# Patient Record
Sex: FEMALE
[De-identification: names, ages, dates, MRNs, and addresses within clinical notes are randomized per-mention and may not be internally consistent; named-entity substitution may affect disease eponyms.]

---

## 2021-03-30 ENCOUNTER — HOSPITAL ENCOUNTER (OUTPATIENT)
Dept: HOSPITAL 90 - LAB | Age: 36
Discharge: HOME | End: 2021-03-30
Attending: NURSE PRACTITIONER
Payer: COMMERCIAL

## 2021-03-30 DIAGNOSIS — Z00.00: Primary | ICD-10-CM

## 2021-03-30 LAB
BASOPHILS NFR BLD AUTO: 0.4 % (ref 0–5)
BUN SERPL-MCNC: 14 MG/DL (ref 7–18)
CHLORIDE SERPL-SCNC: 107 MMOL/L (ref 101–111)
CO2 SERPL-SCNC: 26 MMOL/L (ref 21–32)
CREAT SERPL-MCNC: 0.8 MG/DL (ref 0.5–1.5)
EOSINOPHIL NFR BLD AUTO: 2.6 % (ref 0–8)
ERYTHROCYTE [DISTWIDTH] IN BLOOD BY AUTOMATED COUNT: 14.1 % (ref 11–15.5)
GFR SERPL CREATININE-BSD FRML MDRD: 87 ML/MIN (ref 60–?)
GLUCOSE SERPL-MCNC: 128 MG/DL (ref 70–105)
HBA1C MFR BLD: 5.9 % (ref 4–6)
HCT VFR BLD AUTO: 46.2 % (ref 36–48)
LYMPHOCYTES NFR SPEC AUTO: 26.5 % (ref 21–51)
MCH RBC QN AUTO: 28.7 PG (ref 27–33)
MCHC RBC AUTO-ENTMCNC: 30.7 G/DL (ref 32–36)
MCV RBC AUTO: 93.5 FL (ref 79–99)
MONOCYTES NFR BLD AUTO: 7.9 % (ref 3–13)
NEUTROPHILS NFR BLD AUTO: 61.9 % (ref 40–77)
NRBC BLD MANUAL-RTO: 0 % (ref 0–0.19)
PLATELET # BLD AUTO: 401 K/UL (ref 130–400)
POTASSIUM SERPL-SCNC: 4.8 MMOL/L (ref 3.5–5.1)
RBC # BLD AUTO: 4.94 MIL/UL (ref 4–5.5)
RBC MORPH BLD: (no result)
SODIUM SERPL-SCNC: 142 MMOL/L (ref 136–145)
WBC # BLD AUTO: 9.7 K/UL (ref 4.8–10.8)

## 2021-03-30 PROCEDURE — 36415 COLL VENOUS BLD VENIPUNCTURE: CPT

## 2021-03-30 PROCEDURE — 83036 HEMOGLOBIN GLYCOSYLATED A1C: CPT

## 2021-03-30 PROCEDURE — 80048 BASIC METABOLIC PNL TOTAL CA: CPT

## 2021-03-30 PROCEDURE — 85025 COMPLETE CBC W/AUTO DIFF WBC: CPT

## 2021-11-29 ENCOUNTER — HOSPITAL ENCOUNTER (OUTPATIENT)
Dept: HOSPITAL 90 - RAH | Age: 36
Discharge: HOME | End: 2021-11-29
Attending: NURSE PRACTITIONER
Payer: COMMERCIAL

## 2021-11-29 DIAGNOSIS — R06.02: ICD-10-CM

## 2021-11-29 DIAGNOSIS — Z00.00: Primary | ICD-10-CM

## 2021-11-29 LAB
ALBUMIN SERPL-MCNC: 4.1 G/DL (ref 3.5–5)
ALT SERPL-CCNC: 37 U/L (ref 12–78)
AST SERPL-CCNC: 16 U/L (ref 10–37)
BASOPHILS NFR BLD AUTO: 0.4 % (ref 0–5)
BILIRUB SERPL-MCNC: 0.3 MG/DL (ref 0.2–1)
BUN SERPL-MCNC: 17 MG/DL (ref 7–18)
CHLORIDE SERPL-SCNC: 103 MMOL/L (ref 101–111)
CHOLEST SERPL-MCNC: 189 MG/DL (ref ?–200)
CO2 SERPL-SCNC: 25 MMOL/L (ref 21–32)
CREAT SERPL-MCNC: 0.7 MG/DL (ref 0.5–1.5)
EOSINOPHIL NFR BLD AUTO: 1.7 % (ref 0–8)
ERYTHROCYTE [DISTWIDTH] IN BLOOD BY AUTOMATED COUNT: 13.5 % (ref 11–15.5)
GFR SERPL CREATININE-BSD FRML MDRD: 102 ML/MIN (ref 60–?)
GLUCOSE SERPL-MCNC: 107 MG/DL (ref 70–105)
HBA1C MFR BLD: 5.9 % (ref 4–6)
HCT VFR BLD AUTO: 45.9 % (ref 36–48)
HDLC SERPL-MCNC: 58 MG/DL (ref 35–85)
LDLC SERPL CALC-MCNC: 117 MG/DL (ref 0–99)
LYMPHOCYTES NFR SPEC AUTO: 27.8 % (ref 21–51)
MCH RBC QN AUTO: 29.3 PG (ref 27–33)
MCHC RBC AUTO-ENTMCNC: 31.4 G/DL (ref 32–36)
MCV RBC AUTO: 93.5 FL (ref 79–99)
MONOCYTES NFR BLD AUTO: 6.6 % (ref 3–13)
NEUTROPHILS NFR BLD AUTO: 63.1 % (ref 40–77)
NRBC BLD MANUAL-RTO: 0 % (ref 0–0.19)
PLATELET # BLD AUTO: 181 K/UL (ref 130–400)
POTASSIUM SERPL-SCNC: 4.2 MMOL/L (ref 3.5–5.1)
PROT SERPL-MCNC: 7.8 G/DL (ref 6–8.3)
RBC # BLD AUTO: 4.91 MIL/UL (ref 4–5.5)
SODIUM SERPL-SCNC: 137 MMOL/L (ref 136–145)
TRIGL SERPL-MCNC: 105 MG/DL (ref 30–200)
TSH SERPL DL<=0.05 MIU/L-ACNC: 1.37 UIU/ML (ref 0.36–3.74)
WBC # BLD AUTO: 11.1 K/UL (ref 4.8–10.8)

## 2021-11-29 PROCEDURE — 84443 ASSAY THYROID STIM HORMONE: CPT

## 2021-11-29 PROCEDURE — 80061 LIPID PANEL: CPT

## 2021-11-29 PROCEDURE — 82306 VITAMIN D 25 HYDROXY: CPT

## 2021-11-29 PROCEDURE — 80053 COMPREHEN METABOLIC PANEL: CPT

## 2021-11-29 PROCEDURE — 85025 COMPLETE CBC W/AUTO DIFF WBC: CPT

## 2021-11-29 PROCEDURE — 71046 X-RAY EXAM CHEST 2 VIEWS: CPT

## 2021-11-29 PROCEDURE — 83036 HEMOGLOBIN GLYCOSYLATED A1C: CPT

## 2021-11-29 PROCEDURE — 36415 COLL VENOUS BLD VENIPUNCTURE: CPT

## 2024-08-14 ENCOUNTER — HOSPITAL ENCOUNTER (EMERGENCY)
Dept: HOSPITAL 90 - EDH | Age: 39
LOS: 1 days | Discharge: HOME | End: 2024-08-15
Payer: COMMERCIAL

## 2024-08-14 VITALS — BODY MASS INDEX: 28.68 KG/M2 | WEIGHT: 151.9 LBS | HEIGHT: 61 IN

## 2024-08-14 DIAGNOSIS — Z88.8: ICD-10-CM

## 2024-08-14 DIAGNOSIS — Z98.890: ICD-10-CM

## 2024-08-14 DIAGNOSIS — Z88.2: ICD-10-CM

## 2024-08-14 DIAGNOSIS — Z88.5: ICD-10-CM

## 2024-08-14 DIAGNOSIS — G89.29: Primary | ICD-10-CM

## 2024-08-14 DIAGNOSIS — M54.89: ICD-10-CM

## 2024-08-14 PROCEDURE — 96375 TX/PRO/DX INJ NEW DRUG ADDON: CPT

## 2024-08-14 PROCEDURE — 96374 THER/PROPH/DIAG INJ IV PUSH: CPT

## 2024-08-14 PROCEDURE — 81001 URINALYSIS AUTO W/SCOPE: CPT

## 2024-08-14 PROCEDURE — 96372 THER/PROPH/DIAG INJ SC/IM: CPT

## 2024-08-14 PROCEDURE — 81025 URINE PREGNANCY TEST: CPT

## 2024-08-14 PROCEDURE — 99284 EMERGENCY DEPT VISIT MOD MDM: CPT

## 2024-08-15 VITALS
RESPIRATION RATE: 20 BRPM | SYSTOLIC BLOOD PRESSURE: 130 MMHG | DIASTOLIC BLOOD PRESSURE: 86 MMHG | OXYGEN SATURATION: 100 % | HEART RATE: 77 BPM

## 2024-08-15 LAB
APPEARANCE UR: CLEAR
BACTERIA URNS QL MICRO: (no result) /HPF
BILIRUB UR QL STRIP: NEGATIVE MG/DL
CAOX CRY URNS QL MICRO: (no result) /LPF
COLOR UR: YELLOW
DEPRECATED SQUAMOUS URNS QL MICRO: (no result) /HPF (ref 0–2)
GLUCOSE UR STRIP-MCNC: NEGATIVE MG/DL
HCG UR QL: NEGATIVE
HGB UR QL STRIP: NEGATIVE
KETONES UR STRIP-MCNC: NEGATIVE MG/DL
LEUKOCYTE ESTERASE UR QL STRIP: NEGATIVE LEU/UL
MICRO URNS: YES
NITRITE UR QL STRIP: NEGATIVE
PH UR STRIP: 5.5 [PH] (ref 5–8)
PROT UR QL STRIP: 10 MG/DL
RBC #/AREA URNS HPF: (no result) /HPF (ref 0–1)
SP GR UR STRIP: 1.03 (ref 1–1.03)
UROBILINOGEN UR STRIP-MCNC: 0.2 MG/DL (ref 0.2–1)
WBC #/AREA URNS HPF: (no result) /HPF (ref 0–1)

## 2024-08-15 RX ADMIN — TRIAMCINOLONE ACETONIDE ONE MG: 40 INJECTION, SUSPENSION INTRA-ARTICULAR; INTRAMUSCULAR at 02:32

## 2024-08-15 RX ADMIN — ORPHENADRINE CITRATE ONE MG: 30 INJECTION INTRAMUSCULAR; INTRAVENOUS at 02:31

## 2024-08-15 RX ADMIN — KETOROLAC TROMETHAMINE ONE MG: 30 INJECTION, SOLUTION INTRAMUSCULAR; INTRAVENOUS at 02:31

## 2024-08-19 ENCOUNTER — HOSPITAL ENCOUNTER (OUTPATIENT)
Dept: HOSPITAL 90 - LAB | Age: 39
Discharge: HOME | End: 2024-08-19
Attending: INTERNAL MEDICINE
Payer: COMMERCIAL

## 2024-08-19 DIAGNOSIS — R55: Primary | ICD-10-CM

## 2024-08-19 LAB
BUN SERPL-MCNC: 9 MG/DL (ref 7–18)
CHLORIDE SERPL-SCNC: 104 MMOL/L (ref 101–111)
CO2 SERPL-SCNC: 28 MMOL/L (ref 21–32)
CREAT SERPL-MCNC: 0.5 MG/DL (ref 0.5–1)
GFR SERPL CREATININE-BSD FRML MDRD: 124 ML/MIN (ref 90–?)
GLUCOSE SERPL-MCNC: 83 MG/DL (ref 70–105)
MAGNESIUM SERPL-MCNC: 2.3 MG/DL (ref 1.8–2.4)
POTASSIUM SERPL-SCNC: 4.1 MMOL/L (ref 3.5–5.1)
SODIUM SERPL-SCNC: 139 MMOL/L (ref 136–145)

## 2024-08-19 PROCEDURE — 36415 COLL VENOUS BLD VENIPUNCTURE: CPT

## 2024-08-19 PROCEDURE — 80048 BASIC METABOLIC PNL TOTAL CA: CPT

## 2024-08-19 PROCEDURE — 83735 ASSAY OF MAGNESIUM: CPT

## 2024-11-21 ENCOUNTER — HOSPITAL ENCOUNTER (EMERGENCY)
Dept: HOSPITAL 90 - EDH | Age: 39
Discharge: HOME | End: 2024-11-21
Payer: COMMERCIAL

## 2024-11-21 VITALS
OXYGEN SATURATION: 99 % | RESPIRATION RATE: 18 BRPM | SYSTOLIC BLOOD PRESSURE: 134 MMHG | HEART RATE: 76 BPM | DIASTOLIC BLOOD PRESSURE: 75 MMHG

## 2024-11-21 VITALS — BODY MASS INDEX: 29.64 KG/M2 | WEIGHT: 156.97 LBS | HEIGHT: 61 IN

## 2024-11-21 VITALS — TEMPERATURE: 98.6 F

## 2024-11-21 DIAGNOSIS — Z98.890: ICD-10-CM

## 2024-11-21 DIAGNOSIS — G89.29: Primary | ICD-10-CM

## 2024-11-21 DIAGNOSIS — Z88.2: ICD-10-CM

## 2024-11-21 DIAGNOSIS — Z79.899: ICD-10-CM

## 2024-11-21 DIAGNOSIS — Z88.5: ICD-10-CM

## 2024-11-21 DIAGNOSIS — M54.50: ICD-10-CM

## 2024-11-21 PROCEDURE — 99284 EMERGENCY DEPT VISIT MOD MDM: CPT

## 2024-11-21 PROCEDURE — 96372 THER/PROPH/DIAG INJ SC/IM: CPT

## 2024-11-21 RX ADMIN — ORPHENADRINE CITRATE ONE MG: 30 INJECTION INTRAMUSCULAR; INTRAVENOUS at 19:17

## 2024-11-21 RX ADMIN — TRIAMCINOLONE ACETONIDE ONE MG: 40 INJECTION, SUSPENSION INTRA-ARTICULAR; INTRAMUSCULAR at 19:17

## 2024-11-27 ENCOUNTER — HOSPITAL ENCOUNTER (OUTPATIENT)
Dept: HOSPITAL 90 - LAB | Age: 39
Discharge: HOME | End: 2024-11-27
Payer: COMMERCIAL

## 2024-11-27 DIAGNOSIS — K76.0: Primary | ICD-10-CM

## 2024-11-27 DIAGNOSIS — E78.6: ICD-10-CM

## 2024-11-27 LAB
ALBUMIN SERPL-MCNC: 4 G/DL (ref 3.5–5)
ALT SERPL-CCNC: 47 U/L (ref 12–78)
AST SERPL-CCNC: 26 U/L (ref 10–37)
BASOPHILS # BLD AUTO: 0.04 K/UL (ref 0–0.2)
BASOPHILS NFR BLD AUTO: 0.6 % (ref 0–5)
BILIRUB SERPL-MCNC: 0.4 MG/DL (ref 0.2–1)
BUN SERPL-MCNC: 13 MG/DL (ref 7–18)
CHLORIDE SERPL-SCNC: 105 MMOL/L (ref 101–111)
CO2 SERPL-SCNC: 30 MMOL/L (ref 21–32)
CREAT SERPL-MCNC: 0.7 MG/DL (ref 0.5–1)
EOSINOPHIL # BLD AUTO: 0.15 K/UL (ref 0–0.7)
EOSINOPHIL NFR BLD AUTO: 2.2 % (ref 0–8)
ERYTHROCYTE [DISTWIDTH] IN BLOOD BY AUTOMATED COUNT: 13.8 % (ref 11–15.5)
FERRITIN SERPL-MCNC: 8 NG/ML (ref 15–150)
GFR SERPL CREATININE-BSD FRML MDRD: 114 ML/MIN (ref 90–?)
GLUCOSE SERPL-MCNC: 88 MG/DL (ref 70–105)
HCT VFR BLD AUTO: 36.5 % (ref 36–48)
IMM GRANULOCYTES # BLD: 0.01 K/UL (ref 0–1)
LYMPHOCYTES # SPEC AUTO: 2.2 K/UL (ref 1–4.8)
LYMPHOCYTES NFR SPEC AUTO: 32.7 % (ref 21–51)
MCH RBC QN AUTO: 28.8 PG (ref 27–33)
MCHC RBC AUTO-ENTMCNC: 31.5 G/DL (ref 32–36)
MCV RBC AUTO: 91.5 FL (ref 79–99)
MONOCYTES # BLD AUTO: 0.5 K/UL (ref 0.1–1)
MONOCYTES NFR BLD AUTO: 7.7 % (ref 3–13)
NEUTROPHILS # BLD AUTO: 3.8 K/UL (ref 1.8–7.7)
NEUTROPHILS NFR BLD AUTO: 56.7 % (ref 40–77)
NRBC BLD MANUAL-RTO: 0 % (ref 0–0.19)
PLATELET # BLD AUTO: 324 K/UL (ref 130–400)
POTASSIUM SERPL-SCNC: 4.5 MMOL/L (ref 3.5–5.1)
PROT SERPL-MCNC: 6.8 G/DL (ref 6–8.3)
RBC # BLD AUTO: 3.99 MIL/UL (ref 4–5.5)
SODIUM SERPL-SCNC: 142 MMOL/L (ref 136–145)
WBC # BLD AUTO: 6.8 K/UL (ref 4.8–10.8)

## 2024-11-27 PROCEDURE — 36415 COLL VENOUS BLD VENIPUNCTURE: CPT

## 2024-11-27 PROCEDURE — 85025 COMPLETE CBC W/AUTO DIFF WBC: CPT

## 2024-11-27 PROCEDURE — 80053 COMPREHEN METABOLIC PANEL: CPT

## 2024-11-27 PROCEDURE — 82728 ASSAY OF FERRITIN: CPT

## 2025-01-30 ENCOUNTER — HOSPITAL ENCOUNTER (EMERGENCY)
Dept: HOSPITAL 90 - EDH | Age: 40
Discharge: HOME | End: 2025-01-30
Payer: COMMERCIAL

## 2025-01-30 VITALS — WEIGHT: 162.04 LBS | BODY MASS INDEX: 30.59 KG/M2 | HEIGHT: 61 IN

## 2025-01-30 VITALS
SYSTOLIC BLOOD PRESSURE: 130 MMHG | DIASTOLIC BLOOD PRESSURE: 90 MMHG | HEART RATE: 75 BPM | RESPIRATION RATE: 16 BRPM | TEMPERATURE: 98.1 F | OXYGEN SATURATION: 98 %

## 2025-01-30 DIAGNOSIS — Z88.2: ICD-10-CM

## 2025-01-30 DIAGNOSIS — N39.0: Primary | ICD-10-CM

## 2025-01-30 DIAGNOSIS — Z90.49: ICD-10-CM

## 2025-01-30 DIAGNOSIS — Z88.5: ICD-10-CM

## 2025-01-30 LAB
ALBUMIN SERPL-MCNC: 3.8 G/DL (ref 3.5–5)
ALT SERPL-CCNC: 37 U/L (ref 12–78)
APPEARANCE UR: (no result)
AST SERPL-CCNC: 18 U/L (ref 10–37)
BACTERIA URNS QL MICRO: (no result) /HPF
BILIRUB DIRECT SERPL-MCNC: 0.1 MG/DL (ref 0–0.3)
BILIRUB SERPL-MCNC: 0.3 MG/DL (ref 0.2–1)
BILIRUB UR QL STRIP: NEGATIVE MG/DL
BUN SERPL-MCNC: 10 MG/DL (ref 7–18)
CHLORIDE SERPL-SCNC: 103 MMOL/L (ref 101–111)
CO2 SERPL-SCNC: 30 MMOL/L (ref 21–32)
COLOR UR: (no result)
CREAT SERPL-MCNC: 0.6 MG/DL (ref 0.5–1)
CRYSTALS UR QL AUTO: 1 /HPF
ERYTHROCYTE [DISTWIDTH] IN BLOOD BY AUTOMATED COUNT: 13.7 % (ref 11–15.5)
GFR SERPL CREATININE-BSD FRML MDRD: 118 ML/MIN (ref 90–?)
GLUCOSE SERPL-MCNC: 110 MG/DL (ref 70–105)
GLUCOSE UR STRIP-MCNC: NEGATIVE MG/DL
HCT VFR BLD AUTO: 37.7 % (ref 36–48)
HGB UR QL STRIP: (no result)
KETONES UR STRIP-MCNC: NEGATIVE MG/DL
LEUKOCYTE ESTERASE UR QL STRIP: 500 LEU/UL
MCH RBC QN AUTO: 29.1 PG (ref 27–33)
MCHC RBC AUTO-ENTMCNC: 31.8 G/DL (ref 32–36)
MCV RBC AUTO: 91.5 FL (ref 79–99)
MICRO URNS: YES
NITRITE UR QL STRIP: NEGATIVE
NRBC BLD MANUAL-RTO: 0 % (ref 0–0.19)
PH UR STRIP: 6 [PH] (ref 5–8)
PLATELET # BLD AUTO: 289 K/UL (ref 130–400)
POTASSIUM SERPL-SCNC: 4.1 MMOL/L (ref 3.5–5.1)
PROT SERPL-MCNC: 7.2 G/DL (ref 6–8.3)
PROT UR QL STRIP: 70 MG/DL
RBC # BLD AUTO: 4.12 MIL/UL (ref 4–5.5)
RBC #/AREA URNS HPF: (no result) /HPF (ref 0–1)
SODIUM SERPL-SCNC: 139 MMOL/L (ref 136–145)
SP GR UR STRIP: 1.01 (ref 1–1.03)
UROBILINOGEN UR STRIP-MCNC: 0.2 MG/DL (ref 0.2–1)
WBC # BLD AUTO: 10.2 K/UL (ref 4.8–10.8)
WBC #/AREA URNS HPF: (no result) /HPF (ref 0–1)
WBC CLUMPS #/AREA URNS AUTO: (no result) /HPF (ref 0–1)
YEAST URNS QL MICRO: (no result) /HPF

## 2025-01-30 PROCEDURE — 81001 URINALYSIS AUTO W/SCOPE: CPT

## 2025-01-30 PROCEDURE — 80048 BASIC METABOLIC PNL TOTAL CA: CPT

## 2025-01-30 PROCEDURE — 83690 ASSAY OF LIPASE: CPT

## 2025-01-30 PROCEDURE — 84703 CHORIONIC GONADOTROPIN ASSAY: CPT

## 2025-01-30 PROCEDURE — 36415 COLL VENOUS BLD VENIPUNCTURE: CPT

## 2025-01-30 PROCEDURE — 99285 EMERGENCY DEPT VISIT HI MDM: CPT

## 2025-01-30 PROCEDURE — 87186 SC STD MICRODIL/AGAR DIL: CPT

## 2025-01-30 PROCEDURE — 85027 COMPLETE CBC AUTOMATED: CPT

## 2025-01-30 PROCEDURE — 96374 THER/PROPH/DIAG INJ IV PUSH: CPT

## 2025-01-30 PROCEDURE — 74176 CT ABD & PELVIS W/O CONTRAST: CPT

## 2025-01-30 PROCEDURE — 87086 URINE CULTURE/COLONY COUNT: CPT

## 2025-01-30 PROCEDURE — 80076 HEPATIC FUNCTION PANEL: CPT

## 2025-01-30 PROCEDURE — 96375 TX/PRO/DX INJ NEW DRUG ADDON: CPT

## 2025-01-30 RX ADMIN — SODIUM CHLORIDE ONE MLS/HR: 0.9 INJECTION, SOLUTION INTRAVENOUS at 22:08

## 2025-01-30 RX ADMIN — PHENAZOPYRIDINE HYDROCHLORIDE ONE MG: 200 TABLET ORAL at 22:08

## 2025-01-30 NOTE — ERN
General


Chief Complaint:  Flank Pain


Stated Complaint:  FLANK PAIN,ABDOMINAL PAIN


Time Seen by MD:  18:11


Time Seen by Midlevel:  18:11


Source:  patient





History of Present Illness


Initial Comments


Patient is a 39-year-old female with no significant past medical history 

presenting to the emergency department with bilateral flank pain and lower 

abdominal pain that started earlier today.  This denies any nausea, vomiting, 

fever, chills, or any other symptoms at this time.  The pain is rated 10/10 on a

0-10 scale.  Past surgical history includes appendectomy, cholecystectomy, and 

C-sections


Allergies:  


Coded Allergies:  


     Codeine (Unverified  Allergy, HIVES, 13)


     Meperidine HCl (Unverified  Allergy, CONFUSION, 13)


     Sulfa(Sulfonamide Antibiotics) (Unverified  Allergy, HIVES, 13)


     ondansetron HCl (Unverified  Allergy, CONFUSION, 13)


Home Meds


Active Scripts


Lidocaine (Lidocaine Pain Relief) 4 % Adh..patch, 1 PATCH TP DAILY for 10 Days, 

#10 PATCH 0 Refills


   Prov:RASHMI WILLSON [facility]         24


Cyclobenzaprine HCl (Flexeril) 10 Mg Tab, 10 MG PO TID for muscle sstiffness, 

#14 TAB 0 Refills


   Prov:RASHMI WILLSON [facility]         24


Ketorolac Tromethamine (Ketorolac Tromethamine) 10 Mg Tablet, 10 MG PO BID for 5

Days, #10 TAB


   Prov:MARY HUGO         8/15/24





Past Medical History


Past Medical History:  No Pertinent History


Past Surgical History:  Appendectomy, Cholecystectomy, 





Female(pregnancy History)


Pregnancy History:  Not Applicable





ROS Dictation


CONSTITUTIONAL:  Negative except for HPI


HEAD/FACE:  Negative except for HPI


EENT:  Negative except for HPI


RESPIRATORY: Negative except for HPI


GASTROINTESTINAL/ABDOMINAL:   Negative except for HPI


GENITOURINARY: Negative except for HPI


MUSCULOSKELETAL: Negative except for HPI


INTEGUMENTARY:  Negative except for HPI


NEUROLOGICAL/PSYCH: Negative except for HPI


HEMATOLOGIC/LYMPHATIC:  Negative except for HPI





All Systems Negative, Except as noted above.





13 point review of systems assessed and all negative except for above.





Physical Exam


Physical Exam Dictation


Vital Signs reviewed 


General Appearance: Alert, oriented x 3, no acute distress, well developed, 

nourished. 


Head and Face: non-traumatic.


Eyes: PERRL, pink conjunctivas, eyelid no trauma, anterior chamber with arcus 

senilis. 


Ears: Pinnas intact and no signs of trauma or erythema ear canals clear and no 

discharge TM no erythema 


Nose: No discharge, no bleeding. 


Oropharynx: Mouth normal, tongue pink, 


pharynx clear,no erythema, tonsils no exudates, no abscesses noted,  mucous 

membrane moist 


Neck: Supple, non-tender, no thyromegaly, no masses, no JVD, no bruits 


Breast:Deferred 


Chest:No tenderness, no crepitus, no paradoxical movement, no retractions 


Lungs:Clear, well-ventilated, symmetric, no rales, no wheezing, no rhonchi, no 

stridor, good breath sounds bilaterally 


Heart: Regular rate, regular rhythm, no murmur, no gallops 


Vascular: no peripheral edema, 


Abdomen: Soft, positive bowel sounds, nondistended, no guarding, 


Bilateral CVA tenderness, suprapubic abdominal tenderness, no rebound, no masses

no hepatomegaly, no splenomegaly, no Armenta's sign, no hernias.


Rectal: Deferred


Genital: Deferred


Neurological: Normal speech,  motor function intact, sensory function intact 


Musculoskeletal: Neck nontender, full range of motion, back nontender, full 

range of motion,


Extremities: nontender, full range of motion 


Skin: Color pink, dry, no turgor, no rash, no lacerations, no abrasions, no 

contusions.


Lymphatic: Deferred





Results


Laboratory and Microbiology


Lab and Micro Result





Laboratory Tests








Test


 25


18:15 25


18:50


 


Urine Color


 LIGHT-YELLOW


(YELLOW) 





 


Urine Appearance


 CLOUDY (CLEAR)


H 





 


Urine pH 6.0 (5.0-8.0)   


 


Urine Specific Gravity


 1.014


(1.001-1.031) 





 


Urine Protein


 70 mg/dL


(NEGATIVE)  H 





 


Urine Glucose (UA)


 NEGATIVE mg/dL


(NEGATIVE) 





 


Urine Ketones


 NEGATIVE mg/dL


(NEGATIVE) 





 


Urine Occult Blood


 LARGE


(NEGATIVE)  H 





 


Urine Nitrate


 NEGATIVE


(NEGATIVE) 





 


Urine Bilirubin


 NEGATIVE mg/dL


(NEGATIVE) 





 


Urine Urobilinogen


 0.2 mg/dL


(0.2-1.0) 





 


Urine Leukocyte Esterase


 500 Marjorie/uL


(NEGATIVE)  H 





 


Urine RBC


  /HPF


(0-1)  H 





 


Urine WBC


 26-50 /HPF


(0-1)  H 





 


Urine WBC Clumps (Auto)


 FEW /HPF (0-1)


 





 


Urine Other Crystals (Auto)


 1 /HPF (None


Seen) 





 


Urine Bacteria


 FEW /HPF (None


Seen) 





 


Urine Yeast


 RARE /HPF


(None Seen) 





 


White Blood Count


 


 10.2 K/uL


(4.8-10.8)


 


Red Blood Count


 


 4.12 MIL/uL


(4.00-5.50)


 


Hemoglobin


 


 12.0 g/dL


(12.0-16.0)


 


Hematocrit


 


 37.7 % (36-48)





 


Mean Corpuscular Volume


 


 91.5 fL


(79-99)


 


Mean Corpuscular Hemoglobin


 


 29.1 pg


(27.0-33.0)


 


Mean Corpuscular Hemoglobin


Concent 


 31.8 g/dL


(32.0-36.0)  L


 


Red Cell Distribution Width


 


 13.7 %


(11.0-15.5)


 


Platelet Count


 


 289 K/uL


(130-400)


 


Mean Platelet Volume


 


 9.2 fL


(7.5-10.5)


 


Nucleated Red Blood Cells


 


 0.0 %


(0.0-0.19)


 


Sodium Level


 


 139 mmol/L


(136-145)


 


Potassium Level


 


 4.1 mmol/L


(3.5-5.1)


 


Chloride Level


 


 103 mmol/L


(101-111)


 


Carbon Dioxide Level


 


 30 mmol/L


(21-32)


 


Blood Urea Nitrogen


 


 10 mg/dL


(7-18)


 


Creatinine


 


 0.6 mg/dL


(0.5-1.0)


 


Glomerular Filtration Rate


Calc 


 118 mL/min


(>90)


 


Random Glucose


 


 110 mg/dL


()  H


 


Total Calcium


 


 8.8 mg/dL


(8.5-10.1)


 


Total Bilirubin


 


 0.3 mg/dL


(0.2-1.0)


 


Direct Bilirubin


 


 0.1 mg/dL


(0.0-0.3)


 


Aspartate Amino Transf


(AST/SGOT) 


 18 U/L (10-37)





 


Alanine Aminotransferase


(ALT/SGPT) 


 37 U/L (12-78)





 


Alkaline Phosphatase


 


 75 U/L


()


 


Total Protein


 


 7.2 g/dL


(6.0-8.3)


 


Albumin


 


 3.8 g/dL


(3.5-5.0)


 


Lipase


 


 29 U/L (16-77)





 


Serum Pregnancy Test,


Qualitative 


 NEGATIVE


(NEGATIVE)








Labs Reviewed?:  Yes





MDM


MDM: 


Differential diagnosis:  Urinary tract infection, pyelonephritis, ureter stone





There are no social concerns with this patient.





Prescription drug management


Prescriptions will include:  Macrobid


Medical management and examination interpretation discussions were had by me 

with other qualified healthcare professionals as indicated for the patient's 

care.


ED Course





Orders








Procedure Category Date Status





  Time 


 


Cbc Without LAB 25 Complete





Differential  18:10 


 


Urinalysis Profile LAB 25 Complete





  18:10 


 


Basic Metabolic Panel LAB 25 Complete





  18:12 


 


Hepatic Function Panel LAB 25 Complete





  18:12 


 


Lipase LAB 25 Complete





  18:12 


 


Pregnancy Testing, LAB 25 Complete





Serum Hcg  18:12 


 


Culture Urine JANN 25 In Process





  19:15 


 


Ct Abdomen/Pelvis W/O CT 25 Resulted





Contrast  20:48 


 


0.9%Nacl 1000ml (Ns PHA 25 Complete





1000ml)  21:00 


 


Ketorolac PHA 25 Complete





Tromethamine 15mg/Ml  21:00 


 


Ceftriaxone 1g Vial PHA 25 Complete





 (Rocephine 1g Inj)  21:00 


 


Phenazopyridine Hcl PHA 25 Complete





200 Mg Tab (Pyridium  21:30 








Current Medications








 Medications


  (Trade)  Dose


 Ordered  Sig/Case


 Route


 PRN Reason  Start Time


 Stop Time Status Last Admin


Dose Admin


 


 Ceftriaxone Sodium


  (ROCEphine 1G


 INJ)  1 gm  ONCE  ONCE


 IVPB


   25 21:00


 25 21:01 DC  





 


 Ketorolac


 Tromethamine


  (toRADol)  15 mg  ONCE  ONCE


 IV


   25 21:00


 25 21:01 DC  





 


 Phenazopyridine


 HCl


  (PYRIdium HCL


 200 MG TAB)  200 mg  ONCE  ONCE


 PO


   25 21:30


 25 21:31 DC  





 


 Sodium Chloride  1,000 ml @ 


 0 mls/hr  ONCE  ONCE


 IV


   25 21:00


 25 21:01 DC  











Vital Signs








  Date Time  Temp Pulse Resp B/P (MAP) Pulse Ox O2 Delivery O2 Flow Rate FiO2


 


25 18:07 97.9 78 16 131/90 100 Room Air  





[place] [id number] [address]


                                 [phone]


                                        


                                 IMAGING REPORT


                                     Signed





PATIENT: VIDHI SHELTON                            MR#: [id number]


ACCOUNT#: [id number]                              : 1985


SEX: F AGE: 39                                      LOCATION: [facility]


ORDER DT: 25                             STATUS: [facility]


ACCESSION#: [id number]                            REPORT#: [id number]


SERVICE DT: 25





REASON: r/o kidney stones vs pyelonephritis


ORDERING PHYSICIAN: MARY HUGO


PROCEDURE: ABD PEL WO  -  CT ABDOMEN/PELVIS W/O CONTRAST





Exam Type: CT ABDOMEN/PELVIS W/O CONTRAST





Clinical Information: r/o kidney stones vs pyelonephritis





Comparison: None





CT Dose Index (CTDI): 10.20 mGy


Dose Length Product (DLP): 530.00 total mGy-cm





PROTOCOL:





Routine noncontrast helical scanning of the abdomen and pelvis was


performed at 5mm collimation. 





Findings:





No evidence of nephro or ureterolithiasis is found. No hydronephrosis or


ureteral dilatation is seen. 





The lung bases are clear. 





The stomach is unremarkable. It shows no wall thickening. No gross


ulceration is seen. It is not overly distended. There are no surrounding


inflammatory changes. No wall lesions are identified to suggest cancer.





The spleen is unremarkable. It is not enlarged.





The pancreas shows normal anatomy. It is not fatty replaced. It shows no


lesions. The pancreatic duct is not dilated.





The gallbladder is unremarkable. It shows no cholelithiasis. The


gallbladder wall is normal in thickness. There is no pericholecystic


fluid. The is no acute or chronic inflammation noted.





The adrenal glands are unremarkable. There is no enlargement. No lesions


are noted. 





The liver is unremarkable. It shows no focal masses. 





The appendix is unremarkable. It shows no evidence of inflammation. No


appendicolith is seen.





The small bowel is unremarkable. There is no evidence of dilatation to


suggest obstruction. No evidence of adynamic ileus is seen. There is no


small bowel wall thickening to suggest enteritis.





The colon is unremarkable.





The urinary bladder is unremarkable. There is no wall thickening to


suggest tumor or inflammation. There are no intraluminal calculi. There


are no diverticula. There is no evidence of chronic bladder outlet


obstruction. There is no evidence of urinary bladder distention to


suggest urinary retention.








The other pelvic structures are unremarkable.





Chronic bilateral pars interarticularis fractures with grade 1


anterolisthesis of L5.


     


     


IMPRESSION:


     


NEGATIVE CT SCAN OF THE ABDOMEN AND PELVIS.  NO RENAL STONES. NO ACUTE


PATHOLOGY OR INFLAMMATION SEEN. 





This study was performed using dose reduction techniques to include


automated exposure control and/or adjustment of the mA and/or kV


according to patient size.











DICTATED BY: RADHA SERRATO MD


DATE: 25





ELECTRONICALLY SIGNED BY: RADHA SERRATO MD


DATE: 25








DX & DISP


Disposition:  Discharge


Departure


Impression:  


   Primary Impression:  Urinary tract infection


Condition:  Stable


Scripts


Phenazopyridine HCl (Pyridium) 200 Mg Tablet


200 MG PO BID for painful urination for 5 Days, #10 TAB 0 Refills


   Prov: MARY HUGO         25 


Nitrofurantoin/Nitrofuran Mac (Macrobid) 100 Mg Cap


1 CAP PO BID for 7 Days, #14 CAP 0 Refills


   Prov: MARY HUGO         25





Additional Instructions:  


Your blood work today is stable.  


Your urinalysis is consistent with infection.  


Your CT scan of the abdomen/pelvis does not reveal any evidence of a kidney 

stone or kidney infection.  


You were given IV pain medication and IV antibiotics in the emergency 

department.  


I have given you a prescription for oral antibiotics for outpatient management. 




Please follow up with your primary care doctor in 2-3 days for repeat 

evaluation.  


If you develop any new or worsening symptoms please report to ER for further 

evaluation.


Referrals:  


SHAWNA REYES NP (PCP)


I have reviewed the case, and I agree with, Diagnosis and Plan


I performed the substantive portion of the visit.  I have reviewed and 

personally made and approve the management plan that is documented in the note 

by myself or the SABA. I acknowledge for responsibility for the patient's 

management plan.











MARY HUGO                2025 21:48

## 2025-01-30 NOTE — [facility]
Exam Type: CT ABDOMEN/PELVIS W/O CONTRAST



Clinical Information: r/o kidney stones vs pyelonephritis



Comparison: None



CT Dose Index (CTDI): 10.20 mGy

Dose Length Product (DLP): 530.00 total mGy-cm



PROTOCOL:



Routine noncontrast helical scanning of the abdomen and pelvis was

performed at 5mm collimation. 



Findings:



No evidence of nephro or ureterolithiasis is found. No hydronephrosis or

ureteral dilatation is seen. 



The lung bases are clear. 



The stomach is unremarkable. It shows no wall thickening. No gross

ulceration is seen. It is not overly distended. There are no surrounding

inflammatory changes. No wall lesions are identified to suggest cancer.



The spleen is unremarkable. It is not enlarged.



The pancreas shows normal anatomy. It is not fatty replaced. It shows no

lesions. The pancreatic duct is not dilated.



The gallbladder is unremarkable. It shows no cholelithiasis. The

gallbladder wall is normal in thickness. There is no pericholecystic

fluid. The is no acute or chronic inflammation noted.



The adrenal glands are unremarkable. There is no enlargement. No lesions

are noted. 



The liver is unremarkable. It shows no focal masses. 



The appendix is unremarkable. It shows no evidence of inflammation. No

appendicolith is seen.



The small bowel is unremarkable. There is no evidence of dilatation to

suggest obstruction. No evidence of adynamic ileus is seen. There is no

small bowel wall thickening to suggest enteritis.



The colon is unremarkable.



The urinary bladder is unremarkable. There is no wall thickening to

suggest tumor or inflammation. There are no intraluminal calculi. There

are no diverticula. There is no evidence of chronic bladder outlet

obstruction. There is no evidence of urinary bladder distention to

suggest urinary retention.





The other pelvic structures are unremarkable.



Chronic bilateral pars interarticularis fractures with grade 1

anterolisthesis of L5.

     

     

IMPRESSION:

     

NEGATIVE CT SCAN OF THE ABDOMEN AND PELVIS.  NO RENAL STONES. NO ACUTE

PATHOLOGY OR INFLAMMATION SEEN. 



This study was performed using dose reduction techniques to include

automated exposure control and/or adjustment of the mA and/or kV

according to patient size.